# Patient Record
Sex: MALE | Race: WHITE | Employment: OTHER | URBAN - METROPOLITAN AREA
[De-identification: names, ages, dates, MRNs, and addresses within clinical notes are randomized per-mention and may not be internally consistent; named-entity substitution may affect disease eponyms.]

---

## 2020-02-07 ENCOUNTER — HOSPITAL ENCOUNTER (OUTPATIENT)
Dept: LAB | Age: 65
Discharge: HOME | End: 2020-02-07
Payer: COMMERCIAL

## 2020-02-07 DIAGNOSIS — R27.0 ATAXIA: ICD-10-CM

## 2020-02-07 PROCEDURE — 36415 COLL VENOUS BLD VENIPUNCTURE: CPT

## 2020-02-07 PROCEDURE — 86341 ISLET CELL ANTIBODY: CPT

## 2020-02-07 PROCEDURE — 86255 FLUORESCENT ANTIBODY SCREEN: CPT

## 2020-02-07 PROCEDURE — 84182 PROTEIN WESTERN BLOT TEST: CPT

## 2020-02-07 PROCEDURE — 83519 RIA NONANTIBODY: CPT

## 2020-02-25 LAB
Lab: NORMAL
REFERENCE LAB,REFLB: NORMAL
TEST DESCRIPTION:,ATST: NORMAL

## 2022-03-18 PROBLEM — R27.0 ATAXIA: Status: ACTIVE | Noted: 2020-02-07

## 2022-06-27 ENCOUNTER — HOSPITAL ENCOUNTER (EMERGENCY)
Age: 67
Discharge: HOME | End: 2022-06-27
Attending: EMERGENCY MEDICINE
Payer: MEDICARE

## 2022-06-27 VITALS
DIASTOLIC BLOOD PRESSURE: 81 MMHG | BODY MASS INDEX: 32.93 KG/M2 | OXYGEN SATURATION: 96 % | RESPIRATION RATE: 20 BRPM | SYSTOLIC BLOOD PRESSURE: 141 MMHG | TEMPERATURE: 98.4 F | HEART RATE: 84 BPM | WEIGHT: 230 LBS | HEIGHT: 70 IN

## 2022-06-27 DIAGNOSIS — U07.1 COVID-19 VIRUS INFECTION: Primary | ICD-10-CM

## 2022-06-27 LAB
SARS-COV-2 RDRP RESP QL NAA+PROBE: DETECTED
SOURCE: ABNORMAL

## 2022-06-27 PROCEDURE — 2580000003 HC RX 258: Performed by: NURSE PRACTITIONER

## 2022-06-27 PROCEDURE — 6360000002 HC RX W HCPCS: Performed by: NURSE PRACTITIONER

## 2022-06-27 PROCEDURE — 87635 SARS-COV-2 COVID-19 AMP PRB: CPT

## 2022-06-27 PROCEDURE — 99284 EMERGENCY DEPT VISIT MOD MDM: CPT

## 2022-06-27 PROCEDURE — M0222 HC BEBTELOVIMAB INJECTION: HCPCS

## 2022-06-27 RX ORDER — BEBTELOVIMAB 87.5 MG/ML
175 INJECTION, SOLUTION INTRAVENOUS ONCE
Status: COMPLETED | OUTPATIENT
Start: 2022-06-27 | End: 2022-06-27

## 2022-06-27 RX ORDER — SODIUM CHLORIDE 0.9 % (FLUSH) 0.9 %
5-40 SYRINGE (ML) INJECTION ONCE
Status: COMPLETED | OUTPATIENT
Start: 2022-06-27 | End: 2022-06-27

## 2022-06-27 RX ORDER — ONDANSETRON 4 MG/1
4 TABLET, FILM COATED ORAL 3 TIMES DAILY PRN
Qty: 15 TABLET | Refills: 0 | Status: SHIPPED | OUTPATIENT
Start: 2022-06-27

## 2022-06-27 RX ADMIN — SODIUM CHLORIDE, PRESERVATIVE FREE 5 ML: 5 INJECTION INTRAVENOUS at 13:40

## 2022-06-27 RX ADMIN — BEBTELOVIMAB 175 MG: 87.5 INJECTION, SOLUTION INTRAVENOUS at 13:40

## 2022-06-27 ASSESSMENT — ENCOUNTER SYMPTOMS
VOMITING: 0
ABDOMINAL PAIN: 0
DIARRHEA: 0
COUGH: 0
SHORTNESS OF BREATH: 0
NAUSEA: 1

## 2022-06-27 ASSESSMENT — PAIN SCALES - GENERAL
PAINLEVEL_OUTOF10: 4
PAINLEVEL_OUTOF10: 4

## 2022-06-27 ASSESSMENT — PAIN - FUNCTIONAL ASSESSMENT
PAIN_FUNCTIONAL_ASSESSMENT: PREVENTS OR INTERFERES SOME ACTIVE ACTIVITIES AND ADLS
PAIN_FUNCTIONAL_ASSESSMENT: 0-10

## 2022-06-27 ASSESSMENT — PAIN DESCRIPTION - PAIN TYPE: TYPE: ACUTE PAIN

## 2022-06-27 ASSESSMENT — PAIN DESCRIPTION - ONSET: ONSET: SUDDEN

## 2022-06-27 ASSESSMENT — PAIN DESCRIPTION - FREQUENCY: FREQUENCY: CONTINUOUS

## 2022-06-27 ASSESSMENT — PAIN DESCRIPTION - DESCRIPTORS: DESCRIPTORS: ACHING

## 2022-06-27 ASSESSMENT — PAIN DESCRIPTION - LOCATION: LOCATION: ABDOMEN;GENERALIZED

## 2022-06-27 NOTE — ED NOTES
I have reviewed discharge instructions with the patient. The patient verbalized understanding. Patient left ED via Discharge Method: ambulatory to Home with (self). Opportunity for questions and clarification provided. Patient given 1 scripts. To continue your aftercare when you leave the hospital, you may receive an automated call from our care team to check in on how you are doing. This is a free service and part of our promise to provide the best care and service to meet your aftercare needs.  If you have questions, or wish to unsubscribe from this service please call [phone]. Thank you for Choosing our [facility] Emergency Department.       Marissa Ernandez RN  06/27/22 3852

## 2022-06-27 NOTE — ED PROVIDER NOTES
[facility] Emergency Department Provider Note                   PCP:                Ruma Bowers MD               Age: 78 y.o. Sex: male     No diagnosis found. DISPOSITION         New Prescriptions    No medications on file       Orders Placed This Encounter   Procedures    COVID-19, Rapid        [place] MOR NavarroN - CNP 1:07 PM      MDM  Number of Diagnoses or Management Options  COVID-19 virus infection: new, needed workup  Diagnosis management comments: 60-year-old male history of high cholesterol and thyroid disease who presents emergency department today with complaints of COVID symptoms. He appears uncomfortable on exam but is in no acute distress. Lung sounds are clear. SPO2 is 96% on room air with exertion. Rapid COVID is positive. He is not vaccinated. Discussed risks and benefits with the monoclonal antibodies. Patient does wish to proceed with antibody infusion. Patient monitored for my card. In the emergency department after infusion. No evidence of acute reaction noted during observation period. Supportive treatment for COVID discussed and encouraged. Red flag symptoms discussed. I have discussed the results of all labs, procedures, radiographs, and/or treatments with the patient and available family members. Treatment plan is agreed upon by the patient and the patient is ready for discharge. Questions about treatment in the ED and differential diagnosis of presenting condition were answered. Patient was given verbal discharge instructions including, but not limited to, importance of returning to the emergency department for any concern of worsening or continued symptoms. Instructions were given to follow-up with a primary care provider or specialist within 1 to 2 days.   Adverse effects of medications, if prescribed, were discussed and the patient was advised to refrain from significant physical activity until followed up by a primary care physician and to not drive or operate heavy machinery after taking any sedating substances. During patient's visit to the emergency department in addition to providing emergency care I conducted a COVID-19 counseling visit. He is aware that he needs immediate isolation away from others and that he is to inform any close contacts about him condition. I have also reviewed the signs and symptoms of COVID-19 including the potential for progression of the disease including return precautions and an outpatient plan. We have discussed ways to be successful in the quarantine process and provided instructions about when it is safe to stop strict isolation. He has been informed that the public health department should be contacting him. Risk of Complications, Morbidity, and/or Mortality  Presenting problems: moderate  Diagnostic procedures: moderate  Management options: moderate    Patient Progress  Patient progress: improved       Dae Trinidad is a 78 y.o. male who presents to the Emergency Department with chief complaint of    Chief Complaint   Patient presents with    Concern For COVID-23     29-year-old male with history of high cholesterol and thyroid disease who presents to the emergency department today with complaint of body aches, fever, and chills. He reports positive exposure to COVID as members in his household currently have [employer]. He did not get vaccinated for COVID. He reports associated nausea but denies vomiting or diarrhea. He denies any chest pain, cough, or shortness of breath. He denies any treatment prior to arrival.    The history is provided by the patient. All other systems reviewed and are negative. Review of Systems   Constitutional: Positive for chills and fever. Respiratory: Negative for cough and shortness of breath. Cardiovascular: Negative for chest pain. Gastrointestinal: Positive for nausea. Negative for abdominal pain, diarrhea and vomiting.    Musculoskeletal: Positive for myalgias. All other systems reviewed and are negative. Past Medical History:   Diagnosis Date    Thyroid gland disease         Past Surgical History:   Procedure Laterality Date    OTHER SURGICAL HISTORY      Clavical surgery    TUMOR REMOVAL      WISDOM TOOTH EXTRACTION          Family History   Problem Relation Age of Onset    Heart Disease Mother     Diabetes Maternal Grandmother     Diabetes Father            Social Connections:     Frequency of Communication with Friends and Family: Not on file    Frequency of Social Gatherings with Friends and Family: Not on file    Attends Buddhism Services: Not on file    Active Member of Clubs or Organizations: Not on file    Attends Club or Organization Meetings: Not on file    Marital Status: Not on file        No Known Allergies     Vitals signs and nursing note reviewed. Patient Vitals for the past 4 hrs:   Temp Pulse Resp BP SpO2   06/27/22 1223 99.3 °F (37.4 °C) 99 19 110/80 96 %          Physical Exam  Vitals and nursing note reviewed. Constitutional:       General: He is not in acute distress. Appearance: Normal appearance. He is not ill-appearing, toxic-appearing or diaphoretic. HENT:      Head: Normocephalic and atraumatic. Right Ear: External ear normal.      Left Ear: External ear normal.      Nose: Nose normal.   Eyes:      Extraocular Movements: Extraocular movements intact. Conjunctiva/sclera: Conjunctivae normal.   Cardiovascular:      Rate and Rhythm: Normal rate. Heart sounds: Normal heart sounds. Pulmonary:      Effort: Pulmonary effort is normal. No respiratory distress. Breath sounds: Normal breath sounds. Abdominal:      General: Abdomen is flat. Bowel sounds are normal. There is no distension. Palpations: Abdomen is soft. Tenderness: There is no abdominal tenderness. Musculoskeletal:         General: Normal range of motion. Cervical back: Normal range of motion.    Skin: General: Skin is warm and dry. Capillary Refill: Capillary refill takes less than 2 seconds. Neurological:      General: No focal deficit present. Mental Status: He is alert and oriented to person, place, and time. Psychiatric:         Mood and Affect: Mood normal.         Behavior: Behavior normal.         Thought Content: Thought content normal.         Judgment: Judgment normal.          Procedures    Labs Reviewed   COVID-19, RAPID - Abnormal; Notable for the following components:       Result Value    SARS-CoV-2, Rapid Detected (*)     All other components within normal limits        No orders to display                            Voice dictation software was used during the making of this note. This software is not perfect and grammatical and other typographical errors may be present. This note has not been completely proofread for errors.        Venkata Billingsley, APRN - [phone] [facility]  06/27/22 0750

## 2022-06-27 NOTE — ED TRIAGE NOTES
Pt states that he started experiencing COVID symptoms yesterday. Pt has family members within the home that have tested positive for COVID.

## 2022-06-28 ENCOUNTER — CARE COORDINATION (OUTPATIENT)
Dept: CARE COORDINATION | Facility: CLINIC | Age: 67
End: 2022-06-28

## 2022-06-28 NOTE — CARE COORDINATION
Patient contacted regarding COVID-19 diagnosis and monoclonal antibody infusion follow up. Discussed COVID-19 related testing which was available at this time. Test results were positive. Patient informed of results, if available? Yes. LPN Care Coordinator contacted the family by telephone to perform post discharge assessment. Call within 2 business days of discharge: Yes. Verified name and  with family as identifiers. Provided introduction to self, and explanation of the CTN/ACM role, and reason for call due to risk factors for infection and/or exposure to COVID-19. Symptoms reviewed with family who verbalized the following symptoms: Sore throat, temp last night 100.0 per daughter. Daughter states her father's color appears to be improved from yesterday. Appetite is decreased but able to drink and keep down saltines at this time. Due to no new or worsening symptoms encounter was not routed to provider for escalation. Discussed follow-up appointments. If no appointment was previously scheduled, appointment scheduling offered: Yes. Declined assist.  Daughter states her mother will schedule follow up. [facility] follow up appointment(s): No future appointments. Non-face-to-face services provided:  Obtained and reviewed discharge summary and/or continuity of care documents     Advance Care Planning:   Does patient have an Advance Directive:  decision maker updated. Educated patient about risk for severe COVID-19 due to risk factors according to CDC guidelines. LPN CC reviewed discharge instructions, medical action plan and red flag symptoms with the family who verbalized understanding. Discussed COVID vaccination status: Yes. Education provided on COVID-19 vaccination as appropriate. Discussed exposure protocols and quarantine with CDC Guidelines.  Family was given an opportunity to verbalize any questions and concerns and agrees to contact LPN CC or health care provider for questions related to their healthcare. Reviewed and educated family on any new and changed medications related to discharge diagnosis     Was patient discharged with a pulse oximeter? No     LPN CC provided contact information. Plan for follow-up call in 3-5 days based on severity of symptoms and risk factors.

## 2022-07-01 ENCOUNTER — CARE COORDINATION (OUTPATIENT)
Dept: CARE COORDINATION | Facility: CLINIC | Age: 67
End: 2022-07-01

## 2022-07-01 NOTE — CARE COORDINATION
Patient contacted regarding COVID-19 diagnosis and monoclonal antibody infusion follow up. Discussed COVID-19 related testing which was available at this time. Test results were positive. Patient informed of results, if available? Yes    LPN Care Coordinator contacted the patient by telephone to perform follow-up assessment. Verified name and  with patient as identifiers. Patient has following risk factors of: no known risk factors. Symptoms reviewed with patient who verbalized the following symptoms: cough, fatigue, poor appetite. No blood in sputum. Patient reports feeling better since call on 22 and is now able to eat more solid food. Due to no new or worsening symptoms encounter was not routed to provider for escalation. Educated patient about risk for severe COVID-19 due to risk factors according to CDC guidelines. LPN CC reviewed discharge instructions, medical action plan and red flag symptoms with the patient who verbalized understanding. Discussed COVID vaccination status: Yes. Education provided on COVID-19 vaccination as appropriate. Discussed exposure protocols and quarantine with CDC Guidelines. Patient was given an opportunity to verbalize any questions and concerns and agrees to contact LPN CC or health care provider for questions related to their healthcare. Was patient discharged with a pulse oximeter? no    LPN CC provided contact information. Plan for follow-up call in 5-7 days based on severity of symptoms and risk factors.

## 2022-07-07 ENCOUNTER — CARE COORDINATION (OUTPATIENT)
Dept: CARE COORDINATION | Facility: CLINIC | Age: 67
End: 2022-07-07

## 2022-07-07 NOTE — CARE COORDINATION
You Patient resolved from the Care Transitions episode on 7/7/22   Discussed COVID-19 related testing which was available at this time. Test results were positive. Patient informed of results, if available? Yes    Patient/family has been provided the following resources and education related to COVID-19:                         Signs, symptoms and red flags related to COVID-19            CDC exposure and quarantine guidelines             Patient currently reports that the following symptoms have improved:  Patient reports sx have improved. Now reports some nasal congestion, cough and fatigue. Patient has appointment with PCP tomorrow, 7/8/22. No further outreach scheduled with this CTN/ACM. Episode of Care resolved. Patient has this CTN/ACM contact information if future needs arise.

## 2023-06-28 ENCOUNTER — APPOINTMENT (RX ONLY)
Dept: URBAN - METROPOLITAN AREA CLINIC 329 | Facility: CLINIC | Age: 68
Setting detail: DERMATOLOGY
End: 2023-06-28

## 2023-06-28 DIAGNOSIS — D22 MELANOCYTIC NEVI: ICD-10-CM | Status: STABLE

## 2023-06-28 DIAGNOSIS — L81.4 OTHER MELANIN HYPERPIGMENTATION: ICD-10-CM | Status: STABLE

## 2023-06-28 DIAGNOSIS — L82.1 OTHER SEBORRHEIC KERATOSIS: ICD-10-CM | Status: STABLE

## 2023-06-28 DIAGNOSIS — D18.0 HEMANGIOMA: ICD-10-CM | Status: STABLE

## 2023-06-28 PROBLEM — D18.01 HEMANGIOMA OF SKIN AND SUBCUTANEOUS TISSUE: Status: ACTIVE | Noted: 2023-06-28

## 2023-06-28 PROBLEM — D22.5 MELANOCYTIC NEVI OF TRUNK: Status: ACTIVE | Noted: 2023-06-28

## 2023-06-28 PROCEDURE — ? FULL BODY SKIN EXAM

## 2023-06-28 PROCEDURE — ? COUNSELING

## 2023-06-28 PROCEDURE — ? SUNSCREEN RECOMMENDATIONS

## 2023-06-28 PROCEDURE — 99203 OFFICE O/P NEW LOW 30 MIN: CPT

## 2023-06-28 ASSESSMENT — LOCATION SIMPLE DESCRIPTION DERM
LOCATION SIMPLE: LEFT UPPER BACK
LOCATION SIMPLE: RIGHT UPPER BACK

## 2023-06-28 ASSESSMENT — LOCATION DETAILED DESCRIPTION DERM
LOCATION DETAILED: RIGHT MEDIAL UPPER BACK
LOCATION DETAILED: LEFT INFERIOR UPPER BACK
LOCATION DETAILED: LEFT MEDIAL UPPER BACK
LOCATION DETAILED: RIGHT SUPERIOR MEDIAL UPPER BACK

## 2023-06-28 ASSESSMENT — LOCATION ZONE DERM: LOCATION ZONE: TRUNK
